# Patient Record
Sex: FEMALE | Race: WHITE | NOT HISPANIC OR LATINO
[De-identification: names, ages, dates, MRNs, and addresses within clinical notes are randomized per-mention and may not be internally consistent; named-entity substitution may affect disease eponyms.]

---

## 2017-03-03 ENCOUNTER — APPOINTMENT (OUTPATIENT)
Dept: ORTHOPEDIC SURGERY | Facility: CLINIC | Age: 57
End: 2017-03-03

## 2018-04-25 ENCOUNTER — APPOINTMENT (OUTPATIENT)
Dept: ORTHOPEDIC SURGERY | Facility: CLINIC | Age: 58
End: 2018-04-25

## 2018-05-04 ENCOUNTER — APPOINTMENT (OUTPATIENT)
Dept: ORTHOPEDIC SURGERY | Facility: CLINIC | Age: 58
End: 2018-05-04
Payer: COMMERCIAL

## 2018-05-04 VITALS
DIASTOLIC BLOOD PRESSURE: 72 MMHG | HEART RATE: 79 BPM | HEIGHT: 61 IN | WEIGHT: 180 LBS | BODY MASS INDEX: 33.99 KG/M2 | SYSTOLIC BLOOD PRESSURE: 109 MMHG

## 2018-05-04 DIAGNOSIS — D17.9 BENIGN LIPOMATOUS NEOPLASM, UNSPECIFIED: ICD-10-CM

## 2018-05-04 PROCEDURE — 73610 X-RAY EXAM OF ANKLE: CPT | Mod: LT

## 2018-05-04 PROCEDURE — 99214 OFFICE O/P EST MOD 30 MIN: CPT

## 2018-05-04 PROCEDURE — 73620 X-RAY EXAM OF FOOT: CPT | Mod: LT

## 2018-05-04 RX ORDER — MUPIROCIN 20 MG/G
2 OINTMENT TOPICAL
Qty: 22 | Refills: 0 | Status: COMPLETED | COMMUNITY
Start: 2017-12-29

## 2018-05-25 ENCOUNTER — APPOINTMENT (OUTPATIENT)
Dept: ORTHOPEDIC SURGERY | Facility: CLINIC | Age: 58
End: 2018-05-25
Payer: COMMERCIAL

## 2018-05-25 DIAGNOSIS — T14.8XXA OTHER INJURY OF UNSPECIFIED BODY REGION, INITIAL ENCOUNTER: ICD-10-CM

## 2018-05-25 DIAGNOSIS — Z78.9 OTHER SPECIFIED HEALTH STATUS: ICD-10-CM

## 2018-05-25 DIAGNOSIS — M17.11 UNILATERAL PRIMARY OSTEOARTHRITIS, RIGHT KNEE: ICD-10-CM

## 2018-05-25 PROCEDURE — 99214 OFFICE O/P EST MOD 30 MIN: CPT

## 2018-05-25 PROCEDURE — 73564 X-RAY EXAM KNEE 4 OR MORE: CPT | Mod: LT

## 2018-05-25 RX ORDER — SERTRALINE HYDROCHLORIDE 100 MG/1
100 TABLET, FILM COATED ORAL
Qty: 180 | Refills: 0 | Status: ACTIVE | COMMUNITY
Start: 2018-05-22

## 2018-05-25 RX ORDER — ATORVASTATIN CALCIUM 20 MG/1
20 TABLET, FILM COATED ORAL
Qty: 90 | Refills: 0 | Status: ACTIVE | COMMUNITY
Start: 2018-03-26

## 2018-05-25 RX ORDER — MAGNESIUM HYDROXIDE 400 MG/5ML
1200 SUSPENSION, ORAL (FINAL DOSE FORM) ORAL
Refills: 0 | Status: COMPLETED | COMMUNITY

## 2018-09-28 ENCOUNTER — APPOINTMENT (OUTPATIENT)
Dept: PHYSICAL MEDICINE AND REHAB | Facility: CLINIC | Age: 58
End: 2018-09-28

## 2018-11-30 ENCOUNTER — APPOINTMENT (OUTPATIENT)
Dept: PHYSICAL MEDICINE AND REHAB | Facility: CLINIC | Age: 58
End: 2018-11-30
Payer: COMMERCIAL

## 2018-11-30 VITALS
HEIGHT: 61 IN | OXYGEN SATURATION: 96 % | WEIGHT: 180 LBS | BODY MASS INDEX: 33.99 KG/M2 | HEART RATE: 77 BPM | RESPIRATION RATE: 16 BRPM

## 2018-11-30 DIAGNOSIS — Z82.61 FAMILY HISTORY OF ARTHRITIS: ICD-10-CM

## 2018-11-30 DIAGNOSIS — Z80.9 FAMILY HISTORY OF MALIGNANT NEOPLASM, UNSPECIFIED: ICD-10-CM

## 2018-11-30 PROCEDURE — 99203 OFFICE O/P NEW LOW 30 MIN: CPT

## 2019-01-14 ENCOUNTER — APPOINTMENT (OUTPATIENT)
Dept: ORTHOPEDIC SURGERY | Facility: CLINIC | Age: 59
End: 2019-01-14
Payer: COMMERCIAL

## 2019-01-14 PROCEDURE — 73620 X-RAY EXAM OF FOOT: CPT | Mod: LT

## 2019-01-14 PROCEDURE — 73610 X-RAY EXAM OF ANKLE: CPT | Mod: LT

## 2019-01-14 PROCEDURE — 99214 OFFICE O/P EST MOD 30 MIN: CPT

## 2019-01-14 NOTE — ASSESSMENT
[FreeTextEntry1] : 58-year-old with recent acute pain in the LEFT foot with history of osteoarthritis. She had an MRI last year which showed some edema consistent with a stress reaction in the cuneiform and there was moderate osteoarthritis of the naviculocuneiform joint.\par She was fitted for a short pneumatic walking boot and a cane today and she felt much better walking with ease. She needs to minimize ambulation.\par Elevate and ice. Warm soaks. She can remove the boot sitting and move her ankle for circulation.\par If the pain is not significantly better in the next week she should go for the MRI to rule out a stress fracture.\par Followup in 2 weeks.

## 2019-01-14 NOTE — HISTORY OF PRESENT ILLNESS
[de-identified] : Ms. Pulido comes in for evaluation of her LEFT foot. I had seen her about 7 months ago. She has osteoarthritis in the foot. I was concerned there could be a stress reaction. Her foot was doing okay until a few weeks ago when she started having increasing pain across the top of her foot near the base of the ankle. She called last week to make an appointment. She walks a few miles on weekends but not as much during the week. She was wearing supportive shoes. There was no new injury

## 2019-01-14 NOTE — PHYSICAL EXAM
[Antalgic] : antalgic [LE] : Sensory: Intact in bilateral lower extremities [PT] : posterior tibial 2+ and symmetric bilaterally [Normal RLE] : Right Lower Extremity: No scars, rashes, lesions, ulcers, skin intact [Normal LLE] : Left Lower Extremity: No scars, rashes, lesions, ulcers, skin intact [Normal Touch] : sensation intact for touch [de-identified] : LEFT Foot and Ankle: \par Gait is antalgic.\par The patient is able to walk on heels and toes and do a repetitive heel raise\par slight hindfoot Edema without ecchymoses, erythema.\par Ankle range of motion is with 7 degrees dorsiflexion and 35-40 degrees plantar flexion.\par Subtalar motion intact.\par tender dorsal hindfoot around the navicular\par flatfoot Deformity.\par Motor: 5/5 ATT, GS , EHL, PTT/inversion, peroneals/eversion.\par Normal neurovascular exam\par overweight. [de-identified] : \par X-rays of the LEFT foot and ankle weightbearing diabetes today show osteoarthritis talonavicular joint.. There are changes in the navicular which may be related to subchondral cysts from arthritis but cannot exclude occult fracture.

## 2019-01-30 ENCOUNTER — APPOINTMENT (OUTPATIENT)
Dept: ORTHOPEDIC SURGERY | Facility: CLINIC | Age: 59
End: 2019-01-30

## 2019-02-12 DIAGNOSIS — M54.2 CERVICALGIA: ICD-10-CM

## 2019-02-12 DIAGNOSIS — M54.12 RADICULOPATHY, CERVICAL REGION: ICD-10-CM

## 2019-12-13 ENCOUNTER — APPOINTMENT (OUTPATIENT)
Dept: PHYSICAL MEDICINE AND REHAB | Facility: CLINIC | Age: 59
End: 2019-12-13

## 2022-06-28 PROBLEM — M19.072 PRIMARY OSTEOARTHRITIS OF LEFT FOOT: Status: ACTIVE | Noted: 2018-05-25

## 2022-06-30 ENCOUNTER — APPOINTMENT (OUTPATIENT)
Dept: ORTHOPEDIC SURGERY | Facility: CLINIC | Age: 62
End: 2022-06-30

## 2022-06-30 DIAGNOSIS — Z72.3 LACK OF PHYSICAL EXERCISE: ICD-10-CM

## 2022-06-30 DIAGNOSIS — Z86.39 PERSONAL HISTORY OF OTHER ENDOCRINE, NUTRITIONAL AND METABOLIC DISEASE: ICD-10-CM

## 2022-06-30 DIAGNOSIS — Z87.39 PERSONAL HISTORY OF OTHER DISEASES OF THE MUSCULOSKELETAL SYSTEM AND CONNECTIVE TISSUE: ICD-10-CM

## 2022-06-30 DIAGNOSIS — Z86.59 PERSONAL HISTORY OF OTHER MENTAL AND BEHAVIORAL DISORDERS: ICD-10-CM

## 2022-06-30 DIAGNOSIS — Z82.61 FAMILY HISTORY OF ARTHRITIS: ICD-10-CM

## 2022-06-30 DIAGNOSIS — Z84.1 FAMILY HISTORY OF DISORDERS OF KIDNEY AND URETER: ICD-10-CM

## 2022-06-30 DIAGNOSIS — M79.672 PAIN IN LEFT FOOT: ICD-10-CM

## 2022-06-30 DIAGNOSIS — M19.072 PRIMARY OSTEOARTHRITIS, LEFT ANKLE AND FOOT: ICD-10-CM

## 2022-06-30 DIAGNOSIS — Z80.3 FAMILY HISTORY OF MALIGNANT NEOPLASM OF BREAST: ICD-10-CM

## 2022-06-30 PROCEDURE — 73630 X-RAY EXAM OF FOOT: CPT | Mod: LT

## 2022-06-30 PROCEDURE — 99214 OFFICE O/P EST MOD 30 MIN: CPT

## 2022-06-30 NOTE — PHYSICAL EXAM
[LE] : Sensory: Intact in bilateral lower extremities [PT] : posterior tibial 2+ and symmetric bilaterally [Normal RLE] : Right Lower Extremity: No scars, rashes, lesions, ulcers, skin intact [Normal LLE] : Left Lower Extremity: No scars, rashes, lesions, ulcers, skin intact [Normal Touch] : sensation intact for touch [de-identified] : LEFT Foot and Ankle: \par Gait is not antalgic.\par The patient is able to walk on heels and toes but has mild pain in the foot\par No edema, ecchymoses, erythema.\par Ankle range of motion is with 7 degrees dorsiflexion and 35 degrees plantar flexion.\par Subtalar motion intact.\par tender dorsal hindfoot around the navicular and surrounding joints\par flatfoot Deformity.\par Motor: 5/5 ATT, GS , EHL, PTT/inversion, peroneals/eversion.\par Normal neurovascular exam\par overweight. [de-identified] : \par X-rays of the LEFT foot weightbearing 3 views today show osteoarthritis talonavicular joint.. There are changes in the navicular consistent with degenerative arthritis.\par MRI of the left ankle January 21, 2019 showed moderate midfoot osteoarthritis, 1.2 cm chronic osteochondral lesion of the medial tibial plafond with subchondral cystic change and bone marrow edema, mild reactive bone marrow edema anterior superior process of the calcaneus, evidence of prior plantar fasciitis and small os trigonum without bone marrow edema.  Around the navicular there was osteophytes involving the talonavicular joint and naviculocuneiform joint and second and third tarsometatarsal joints.

## 2022-06-30 NOTE — HISTORY OF PRESENT ILLNESS
[de-identified] : Ms. Pulido comes in for evaluation of her LEFT foot. I had seen her about 2 1/2 yrs ago. She has osteoarthritis in the left midfoot.  Her foot had been doing okay until about a year ago when she had a massage she had pain that started after that in the dorsal hindfoot to midfoot region.  Pain has been variable and intermittent.  Pain can be a 5-9 out of 10 throbbing aggravated by walking.  It is better if she wears a sneaker that make sure that it is less supportive.  She has not had any evaluation or treatment for this.  She occasionally takes\par No swelling

## 2022-06-30 NOTE — ASSESSMENT
[FreeTextEntry1] : 62-year-old with pain in the LEFT foot with history of osteoarthritis of the midfoot at the talonavicular and naviculocuneiform joints and tarsometatarsal joints.  \par She should not wear flat shoes as she is wearing today.  I suggested using [employer] sneakers or a different very supportive and somewhat rigid, rocker-bottom shoe.  Warm soaks and ice.\par I think it is unlikely she has a stress fracture but if pain persist she should come in to evaluate further.\par Follow-up as needed.

## 2023-10-09 ENCOUNTER — APPOINTMENT (OUTPATIENT)
Dept: ORTHOPEDIC SURGERY | Facility: CLINIC | Age: 63
End: 2023-10-09
Payer: MEDICARE

## 2023-10-09 DIAGNOSIS — S30.0XXA CONTUSION OF LOWER BACK AND PELVIS, INITIAL ENCOUNTER: ICD-10-CM

## 2023-10-09 PROCEDURE — 99213 OFFICE O/P EST LOW 20 MIN: CPT

## 2023-11-17 ENCOUNTER — APPOINTMENT (OUTPATIENT)
Dept: ORTHOPEDIC SURGERY | Facility: CLINIC | Age: 63
End: 2023-11-17
Payer: MEDICARE

## 2023-11-17 DIAGNOSIS — M17.12 UNILATERAL PRIMARY OSTEOARTHRITIS, LEFT KNEE: ICD-10-CM

## 2023-11-17 PROCEDURE — 99213 OFFICE O/P EST LOW 20 MIN: CPT

## 2023-11-17 PROCEDURE — 73564 X-RAY EXAM KNEE 4 OR MORE: CPT | Mod: LT

## 2024-11-27 ENCOUNTER — APPOINTMENT (OUTPATIENT)
Dept: ORTHOPEDIC SURGERY | Facility: CLINIC | Age: 64
End: 2024-11-27
Payer: MEDICARE

## 2024-11-27 DIAGNOSIS — M19.072 PRIMARY OSTEOARTHRITIS, LEFT ANKLE AND FOOT: ICD-10-CM

## 2024-11-27 DIAGNOSIS — M79.672 PAIN IN LEFT FOOT: ICD-10-CM

## 2024-11-27 DIAGNOSIS — M20.42 OTHER HAMMER TOE(S) (ACQUIRED), LEFT FOOT: ICD-10-CM

## 2024-11-27 DIAGNOSIS — M79.662 PAIN IN LEFT LOWER LEG: ICD-10-CM

## 2024-11-27 DIAGNOSIS — S86.112A STRAIN OF OTHER MUSCLE(S) AND TENDON(S) OF POSTERIOR MUSCLE GROUP AT LOWER LEG LEVEL, LEFT LEG, INITIAL ENCOUNTER: ICD-10-CM

## 2024-11-27 PROCEDURE — 99214 OFFICE O/P EST MOD 30 MIN: CPT

## 2024-11-27 PROCEDURE — 73600 X-RAY EXAM OF ANKLE: CPT | Mod: LT

## 2024-11-27 PROCEDURE — 73630 X-RAY EXAM OF FOOT: CPT | Mod: LT
